# Patient Record
Sex: FEMALE | Race: BLACK OR AFRICAN AMERICAN | Employment: FULL TIME | URBAN - METROPOLITAN AREA
[De-identification: names, ages, dates, MRNs, and addresses within clinical notes are randomized per-mention and may not be internally consistent; named-entity substitution may affect disease eponyms.]

---

## 2024-02-19 ENCOUNTER — APPOINTMENT (OUTPATIENT)
Dept: RADIOLOGY | Facility: HOSPITAL | Age: 54
End: 2024-02-19
Payer: COMMERCIAL

## 2024-02-19 ENCOUNTER — HOSPITAL ENCOUNTER (EMERGENCY)
Facility: HOSPITAL | Age: 54
Discharge: HOME | End: 2024-02-19
Payer: COMMERCIAL

## 2024-02-19 VITALS
OXYGEN SATURATION: 100 % | WEIGHT: 180 LBS | BODY MASS INDEX: 28.93 KG/M2 | RESPIRATION RATE: 18 BRPM | DIASTOLIC BLOOD PRESSURE: 88 MMHG | TEMPERATURE: 98.1 F | HEART RATE: 86 BPM | HEIGHT: 66 IN | SYSTOLIC BLOOD PRESSURE: 154 MMHG

## 2024-02-19 DIAGNOSIS — S09.90XA HEAD INJURY, INITIAL ENCOUNTER: Primary | ICD-10-CM

## 2024-02-19 PROCEDURE — 72125 CT NECK SPINE W/O DYE: CPT

## 2024-02-19 PROCEDURE — 99285 EMERGENCY DEPT VISIT HI MDM: CPT | Mod: 25

## 2024-02-19 PROCEDURE — 70450 CT HEAD/BRAIN W/O DYE: CPT | Performed by: RADIOLOGY

## 2024-02-19 PROCEDURE — 72125 CT NECK SPINE W/O DYE: CPT | Performed by: RADIOLOGY

## 2024-02-19 PROCEDURE — 70450 CT HEAD/BRAIN W/O DYE: CPT

## 2024-02-19 RX ORDER — ACETAMINOPHEN 325 MG/1
TABLET ORAL
Status: COMPLETED
Start: 2024-02-19 | End: 2024-02-19

## 2024-02-19 RX ORDER — ACETAMINOPHEN 325 MG/1
650 TABLET ORAL ONCE
Status: COMPLETED | OUTPATIENT
Start: 2024-02-19 | End: 2024-02-19

## 2024-02-19 RX ADMIN — ACETAMINOPHEN 650 MG: 325 TABLET ORAL at 10:35

## 2024-02-19 ASSESSMENT — COLUMBIA-SUICIDE SEVERITY RATING SCALE - C-SSRS
6. HAVE YOU EVER DONE ANYTHING, STARTED TO DO ANYTHING, OR PREPARED TO DO ANYTHING TO END YOUR LIFE?: NO
2. HAVE YOU ACTUALLY HAD ANY THOUGHTS OF KILLING YOURSELF?: NO
1. IN THE PAST MONTH, HAVE YOU WISHED YOU WERE DEAD OR WISHED YOU COULD GO TO SLEEP AND NOT WAKE UP?: NO

## 2024-02-19 ASSESSMENT — PAIN - FUNCTIONAL ASSESSMENT: PAIN_FUNCTIONAL_ASSESSMENT: 0-10

## 2024-02-19 ASSESSMENT — PAIN DESCRIPTION - PROGRESSION: CLINICAL_PROGRESSION: NOT CHANGED

## 2024-02-19 ASSESSMENT — PAIN DESCRIPTION - LOCATION: LOCATION: BACK

## 2024-02-19 ASSESSMENT — PAIN SCALES - GENERAL: PAINLEVEL_OUTOF10: 5 - MODERATE PAIN

## 2024-02-19 NOTE — ED PROVIDER NOTES
HPI   Chief Complaint   Patient presents with    Fall     Pt to ED via EMS for c/o fall. Pt states she slipped and fell on ice hitting the back of her head. C/o  headache. Denies blood thinners. Denies LOC. Denies vomiting.        HPI  HISTORY OF PRESENT ILLNESS:  54 y.o. female who presents to the ED via EMS with complaint of a fall with a head injury.  Patient was at work this morning, and about 1 hour ago she slipped on ice, slid and hit the back of her head on the ground.  She is complaining of a headache at the back of the head.  She denies any wounds or bleeding.  She did not lose consciousness.  Event was witnessed by coworkers, and confirmed that she did not lose consciousness and had no seizure-like activity.  She denies sustaining any other injuries, no pain in any other body part.  No neck pain, no back pain, no pain in the chest wall or abdomen, no pain in the upper or lower extremities.  No medications taken for symptoms.  Has had a prior hysterectomy.  No confusion. Denies amnesia. No nausea or vomiting. Denies weakness, numbness, or tingling of extremities. No blurry vision or vision loss. No drug or alcohol use. No use of anticoagulants or antiplatelets. No history of bleeding or clotting disorders.  No other complaints or symptoms voiced.    PMH: reviewed  Family history: noncontributory  Social history: non smoker, no ETOH, no illicit substances    12 point review of systems was performed and is negative unless otherwise specified in HPI.        [place] Coma Scale Score: 15                     Patient History   No past medical history on file.  No past surgical history on file.  No family history on file.  Social History     Tobacco Use    Smoking status: Not on file    Smokeless tobacco: Not on file   Substance Use Topics    Alcohol use: Not on file    Drug use: Not on file       Physical Exam   ED Triage Vitals [02/19/24 0949]   Temperature Heart Rate Respirations BP   36.7 °C (98.1 °F) 86 18  154/88      Pulse Ox Temp src Heart Rate Source Patient Position   100 % -- -- --      BP Location FiO2 (%)     -- --       Physical Exam  General: Vital signs stable. Alert & oriented.  No acute distress. Well nourished. Well hydrated. GCS=15  Neuro: Cranial nerves grossly intact. Normal gait. No motor or sensory changes. Appropriate and equal sensation and strength bilaterally.  No focal findings identified.  HENMT: Mild tenderness over occipital scalp. No laceration. No hematoma, abrasion, or ecchymosis. No Raccoon eyes, No [place] sign. Facial bones and skull nontender, no step-offs or crepitus. Mucous membranes moist. No pharyngeal erythema, uvula midline, teeth intact. Trachea is midline. External ears and nose normal, bilateral TMs normal, no hemotympanum. Hearing grossly intact. No meningeal signs, moves neck freely. No C-spine tenderness, normal head and neck range of motion.  Eyes: PERRL, EOMs intact and nonpainful. No nystagmus. Visual fields intact bilaterally. Conjunctiva clear with no redness.  Cardiac: Sinus rhythm.  Pulmonary: No respiratory distress, normal respiratory effort. No accessory muscle use. CTAB. Chest wall nontender to palpation.  Abdominal: Soft and nontender to palpation in all quadrants.  Skin: Intact, warm, and dry. No lesions, rash, petechiae, or purpura.  MSK: full range of motion of upper and lower extremities. No tenderness over the midline cervical, thoracic, or lumbar spine. No deformities. No gross dislocations. Extremities are warm and well perfused, no cyanosis, no edema.  No edema or tenderness over all 4 extremities.  Psych: Appropriate mood and affect.  ED Course & MDM   Diagnoses as of 02/19/24 2040   Head injury, initial encounter       Medical Decision Making  ED course / MDM     Summary:  Patient presented with a fall with a head injury.  No loss of consciousness.  Has a headache, no other symptoms.  No blood thinners.  Vital signs stable, hypertensive in triage at  124/88, overall well-appearing, nontoxic.  Ambulates unassisted with a steady gait.  On exam, there is some tenderness over the occipital scalp, no hematoma or laceration, no bleeding, nontender over the facial bones and the midline cervical, thoracic, and lumbar spine.  Normal neurologic exam, no motor or sensory deficits.  Normal pupillary exam.  No ataxia.  She has no other areas of tenderness throughout the body, no pain in any other body part, denies any other injuries.  Was given a dose of Tylenol in the ED, which completely resolved her headache.  CT scans of the C-spine and head show no intracranial hemorrhage, no skull fracture, no acute fracture or subluxation of the cervical spine, show reversal of the normal cervical lordosis and mild degenerative changes. Results and differential were discussed in detail with the patient.  She is requesting to be discharged.  Feels reassured with normal CT scans.  Did discuss concussion precautions and postconcussion syndrome. Patient was given strict return precautions, understands reasons to return to the ED. Also discussed supportive care instructions. I expressed the importance of outpatient follow up with their PCP. All questions were answered, patient expressed understanding and stated that they would comply.    Patient was advised to follow up with PCP or recommended provider in 2-3 days for another evaluation and exam. I advised patient and family/friend/caregiver/guardian to return or go to closest emergency room immediately if symptoms change, get worse, new symptoms develop prior to follow up. If there is no improvement in symptoms in the next 24 hours they are advised to return for further evaluation and exam. I also explained the plan and treatment course. Patient and family/friend/caregiver/guardian is in agreement with plan, treatment course, and follow up and states verbally that they will comply.    Impression:  1. See diagnosis    Plan: Homegoing. I  discussed the differential, results, and discharge plan with the patient and family/friend/caregiver. I emphasized the importance of follow-up with the physician I referred them to in the timeframe recommended.  I explained reasons for the patient to return to the Emergency Department. They agreed that if they feel their condition is worsening or if they have any other concern they should call 911 immediately for further assistance. We also discussed medications that were prescribed including common side effects and interactions. The patient was advised to abstain from driving, operating heavy machinery, or making significant decisions while taking medications such as opiates and muscle relaxers that may impair this. I gave the patient an opportunity to ask all questions they had and answered all of them accordingly. They understand return precautions and discharge instructions. The patient and family/friend/caregiver expressed understanding verbally and that they would comply.       Disposition: Discharge    This note has been transcribed using voice recognition and may contain grammatical errors, misplaced words, incorrect words, incorrect phrases or other errors.   Procedure  Procedures     Ragini Hull PA-C  02/19/24 2042

## 2024-10-02 ENCOUNTER — APPOINTMENT (OUTPATIENT)
Dept: URGENT CARE | Age: 54
End: 2024-10-02
Payer: COMMERCIAL